# Patient Record
Sex: FEMALE | Race: WHITE | NOT HISPANIC OR LATINO | Employment: FULL TIME | URBAN - METROPOLITAN AREA
[De-identification: names, ages, dates, MRNs, and addresses within clinical notes are randomized per-mention and may not be internally consistent; named-entity substitution may affect disease eponyms.]

---

## 2018-05-10 DIAGNOSIS — O28.0 ABNORMAL MATERNAL SERUM SCREENING TEST: ICD-10-CM

## 2018-05-10 DIAGNOSIS — Z36.89 ENCOUNTER FOR FETAL ANATOMIC SURVEY: Primary | ICD-10-CM

## 2018-05-17 ENCOUNTER — OFFICE VISIT (OUTPATIENT)
Dept: MATERNAL FETAL MEDICINE | Facility: CLINIC | Age: 28
End: 2018-05-17
Payer: COMMERCIAL

## 2018-05-17 VITALS — DIASTOLIC BLOOD PRESSURE: 84 MMHG | SYSTOLIC BLOOD PRESSURE: 130 MMHG | WEIGHT: 189.81 LBS

## 2018-05-17 DIAGNOSIS — Z36.89 ENCOUNTER FOR FETAL ANATOMIC SURVEY: ICD-10-CM

## 2018-05-17 DIAGNOSIS — O28.0 ABNORMAL MATERNAL SERUM SCREENING TEST: ICD-10-CM

## 2018-05-17 PROCEDURE — 99999 PR PBB SHADOW E&M-EST. PATIENT-LVL II: CPT | Mod: PBBFAC,,,

## 2018-05-17 PROCEDURE — 76811 OB US DETAILED SNGL FETUS: CPT | Mod: S$GLB,,, | Performed by: OBSTETRICS & GYNECOLOGY

## 2018-05-17 PROCEDURE — 99242 OFF/OP CONSLTJ NEW/EST SF 20: CPT | Mod: 25,S$GLB,, | Performed by: OBSTETRICS & GYNECOLOGY

## 2018-05-17 NOTE — PROGRESS NOTES
"OB Ultrasound Report (see PDF version under imaging tab) and consulation    Indication  ========    New Consult: Fetal anatomy survey/+AFP for ONTD.    History  ======    General History  Blood group: A  Rhesus: Rh positive  Height 160 cm  Height (ft) 5 ft  Height (in) 3 in  Previous Outcomes  Preg. no. 1  Outcome: Live YOB: 2011  Gest. age 36 w + 0 d  Gender: female  Details: 5 lb 2 oz; ; preeclampsia   2  Para 1  Strauss children born (P) 1  Strauss living children (L) 1  Strauss children born living (P) 1  Family History  Relative: Father's father  Details: Clubbed feet  Relative: Uncle (father's side)  Details: Mental retardation    Pregnancy History  ===============    Maternal Lab Tests  Test: Quad/ Penta Screen  Result: DSR: 1:917; T18R: 1:2756; AFP +ONTD AFP MoM: 3.14  Wants to know gender: yes    Maternal Assessment  ================    Weight 86 kg  Weight (lb) 190 lb  Height 160 cm  Height (ft) 5 ft  Height (in) 3 in  BP syst 130 mmHg  BP diast 84 mmHg  BMI 33.59 kg/m²    Method  ======    Transabdominal ultrasound examination. View: Good view.    Pregnancy  =========    Strauss pregnancy. Number of fetuses: 1.    Dating  ======    Cycle: regular cycle  GA by "stated dating" 19 w + 0 d  CARLOS by "stated dating": 10/11/2018  Ultrasound examination on: 2018  GA by U/S based upon: AC, BPD, Femur, HC  GA by U/S 18 w + 2 d  CARLOS by U/S: 10/16/2018  Assigned: Dating performed on 2018, based on the external assessment  Assigned GA 19 w + 0 d  Assigned CARLOS: 10/11/2018    General Evaluation  ===============    Cardiac activity: present.  bpm.  Fetal movements: visualized.  Presentation: cephalic.  Placenta:  Placental site: anterior, placenta previa.  Umbilical cord: Cord vessels: 3 vessel cord. Cord insertion: placental insertion: normal.  Amniotic fluid: normal amount.    Fetal Biometry  ===========    Fetal Biometry  BPD 39.5 mm 18w 0d Hadlock  OFD 52.4 mm 18w 6d " John  .0 mm 18w 0d Hadlock  .6 mm 19w 1d Hadlock  Femur 26.9 mm 18w 1d Hadlock  Cerebellum tr 18.2 mm 18w 4d Rizzo  CM 2.7 mm  Nuchal fold 3.76 mm  Humerus 26.7 mm 18w 3d John   g  Calculated by: Hadlock (BPD-HC-AC-FL)  EFW (lb) 0 lb  EFW (oz) 9 oz  Cephalic index 0.75  HC / AC 1.08  FL / BPD 0.68  FL / AC 0.20   bpm    Fetal Anatomy  ===========    Cranium: normal  Lateral ventricles: normal  Choroid plexus: normal  Midline falx: normal  Cavum septi pellucidi: normal  Cerebellum: normal  Cisterna magna: normal  Head shape: normal  Rt lateral ventricle: normal  Lt lateral ventricle: normal  Rt choroid plexus: normal  Lt choroid plexus: normal  Parenchyma: normal  Third ventricle: normal  Posterior fossa: normal  Cerebellar lobes: normal  Vermis: normal  Neck: suboptimal  Nuchal fold: normal  Lips: normal  Profile: normal  Nose: normal  Maxilla: normal  Mandible: normal  4-chamber view: suboptimal  RVOT: suboptimal  LVOT: suboptimal  Heart / Thorax: Septal views: normal  Situs: normal  Aortic arch: suboptimal  Ductal arch: suboptimal  SVC: suboptimal  IVC: suboptimal  3-vessel view: suboptimal  3-vessel-trachea view: suboptimal  Cardiac position: normal  Cardiac axis: normal  Cardiac size: normal  Cardiac rhythm: normal  Rt lung: normal  Lt lung: normal  Diaphragm: normal  Cord insertion: normal  Stomach: normal  Kidneys: normal  Bladder: normal  Genitals: normal  Abdom. wall: appears normal  Abdom. cavity: normal  Rt kidney: normal  Lt kidney: normal  Liver: normal  Bowel: normal  Cervical spine: normal  Thoracic spine: normal  Lumbar spine: normal  Sacral spine: normal  Arms: normal  Legs: normal  Rt arm: normal  Lt arm: normal  Rt hand: present  Lt hand: present  Rt leg: normal  Lt leg: normal  Rt foot: normal  Lt foot: normal  Position of hands: normal  Position of feet: normal  Gender: male  Wants to know gender: yes    Maternal Structures  ===============    Uterus /  Cervix  Uterus: Normal  Cervical length 43.8 mm  Ovaries / Tubes / Adnexa  Rt ovary: Visualized  Lt ovary: Visualized  Other: Uterus and adnexa normal    Consultation  ==========    Type: Elevated MSAFP.  Physician requesting consultation: Dr. RUBY Barr  I spent 30 minutes on the consultation today with the patient and her spouse regarding the results from her quad screen and regarding  findings from today's ultrasound exam. More than 50% of the consultation was spent in face-to-face counseling and coordination of  care.  The patient is a 26 y/o  who is currently 19 weeks pregnant. She was referred for consultation because the MSAFP was  elevated at 3.14 MOM on her quad screen. The risks for Trisomy 21 and Trisomy 18 were not elevated. The patient denies any vaginal  bleeding during this pregnancy or any other pregnancy complications. She was induced at 36 weeks during her other pregnancy due  to preeclampsia. She also denies any ongoing chronic medical problems. She denies any known family history of birth defects,  chromosomal or genetic disorders, or significant developmental delay. Her  reports that his father was born with clubbed feet.  His father did not undergo surgical treatment for his club feet, and other than walking with somewhat of a limp, is in good physical  condition. The patient's  also reports that his paternal uncle has some form of mental retardation. The name of the disorder or  cause of it is unknown. The  denies any other family history of congenital birth defects, chromosomal or genetic disorders, or  significant developmental delay.  We reviewed some of the possible reasons for an elevated MSAFP, including fetal neural tube defects, fetal abdominal wall defects,  bleeding in pregnancy, and placental abnormalities. Fortunately, ultrasound today shows no evidence of an open neural tube defect,  abdominal wall defect, or other fetal structural abnormality. Unexplained  elevations of MSAFP are associated with a number of  pregnancy complications, including IUGR, oligohydramnios, placental abruption and other causes of bleeding in pregnancy, fetal loss,  gestational hypertension, and  labor. The likelihood of these complications and their severity corresponds with the degree of  elevation of MSAFP.  In the absence of fetal growth restriction,  testing is not recommended by our MFM group at [facility]. Serial assessment by  ultrasound of fetal growth beginning in the third trimester is recommended.    Impression  =========    A detailed fetal anatomic ultrasound examination was performed for the following high risk indication: elevated MSAFP. No fetal  structural malformations are identified; however, fetal imaging is incomplete today. Imaging of the spine and intracranial anatomy is  complete, and there is no evidence of an open neural tube defect.  A follow-up study will be scheduled to complete the fetal anatomic survey. Fetal size today is consistent with established gestational  age.  Cervical length is normal.  An anterior placenta previa is identified.    Recommendation  ==============    1. F/U exam in 4 weeks to complete the fetal anatomic survey  2. Serial ultrasound exams to assess fetal growth q 4 weeks beginning at 28 weeks  3. Begin low-dose aspirin therapy due to history of preeclampsia in the last pregnancy.

## 2018-06-14 ENCOUNTER — OFFICE VISIT (OUTPATIENT)
Dept: MATERNAL FETAL MEDICINE | Facility: CLINIC | Age: 28
End: 2018-06-14
Payer: COMMERCIAL

## 2018-06-14 VITALS
HEIGHT: 63 IN | SYSTOLIC BLOOD PRESSURE: 128 MMHG | WEIGHT: 197 LBS | DIASTOLIC BLOOD PRESSURE: 71 MMHG | BODY MASS INDEX: 34.91 KG/M2

## 2018-06-14 DIAGNOSIS — O44.02 PLACENTA PREVIA IN SECOND TRIMESTER: ICD-10-CM

## 2018-06-14 DIAGNOSIS — O28.0 ABNORMAL MATERNAL SERUM SCREENING TEST: ICD-10-CM

## 2018-06-14 PROCEDURE — 3008F BODY MASS INDEX DOCD: CPT | Mod: ICN,,, | Performed by: OBSTETRICS & GYNECOLOGY

## 2018-06-14 PROCEDURE — 76816 OB US FOLLOW-UP PER FETUS: CPT | Mod: 26,ICN,, | Performed by: OBSTETRICS & GYNECOLOGY

## 2018-06-14 PROCEDURE — 99212 OFFICE O/P EST SF 10 MIN: CPT | Mod: 25,ICN,, | Performed by: OBSTETRICS & GYNECOLOGY

## 2018-06-14 RX ORDER — NAPROXEN SODIUM 220 MG/1
81 TABLET, FILM COATED ORAL DAILY
COMMUNITY

## 2018-07-12 ENCOUNTER — TELEPHONE (OUTPATIENT)
Dept: MATERNAL FETAL MEDICINE | Facility: CLINIC | Age: 28
End: 2018-07-12

## 2018-07-12 NOTE — TELEPHONE ENCOUNTER
RN spoke with Annia at Dr. Barr's office on 7/12/18 to make them aware that the patient did not come for her [facility] appointment. Annia stated that the patient was seen by Dr. Barr today and they did an ultrasound.